# Patient Record
Sex: FEMALE | Race: WHITE | URBAN - METROPOLITAN AREA
[De-identification: names, ages, dates, MRNs, and addresses within clinical notes are randomized per-mention and may not be internally consistent; named-entity substitution may affect disease eponyms.]

---

## 2017-01-11 ENCOUNTER — OFFICE VISIT (OUTPATIENT)
Dept: DERMATOLOGY | Age: 52
End: 2017-01-11

## 2017-01-11 DIAGNOSIS — L57.8 DIFFUSE PHOTODAMAGE OF SKIN: ICD-10-CM

## 2017-01-11 DIAGNOSIS — L72.0 EPIDERMAL CYST: ICD-10-CM

## 2017-01-11 DIAGNOSIS — D22.9 BENIGN NEVUS: Primary | ICD-10-CM

## 2017-01-11 PROCEDURE — 99213 OFFICE O/P EST LOW 20 MIN: CPT | Performed by: DERMATOLOGY

## 2018-01-22 ENCOUNTER — OFFICE VISIT (OUTPATIENT)
Dept: DERMATOLOGY | Age: 53
End: 2018-01-22

## 2018-01-22 DIAGNOSIS — L57.8 DIFFUSE PHOTODAMAGE OF SKIN: ICD-10-CM

## 2018-01-22 DIAGNOSIS — L70.0 ACNE VULGARIS: ICD-10-CM

## 2018-01-22 DIAGNOSIS — D22.9 BENIGN NEVUS: Primary | ICD-10-CM

## 2018-01-22 DIAGNOSIS — L72.0 EPIDERMAL CYST: ICD-10-CM

## 2018-01-22 PROCEDURE — 99214 OFFICE O/P EST MOD 30 MIN: CPT | Performed by: DERMATOLOGY

## 2018-01-22 NOTE — PROGRESS NOTES
[address] [place] [address] Dermatology  Lilliam Berumen M.D.  [phone]       Gabe Starks  1965    46 y.o. female     Date of Visit: 1/22/2018    Chief Complaint:   Chief Complaint   Patient presents with    1 Year Follow Up    Skin Exam        I was asked to see this patient by Dr. Mcdonnell ref. provider found. History of Present Illness:  1. Total body skin exam    Stable lentigines face including right medial canthus. Not itching, bleeding, growing. Stable in size, shape, color    Occasional acne outbreak-uses daughters clindamycin solution to spot treat. Would like her own prescription. Multiple nevi. Stable in size, shape, color. Asymptomatic. Epidermal cyst left upper back-never had removed. Not bothersome. Has never been infected. Skin history:  No personal history of dysplastic nevi or skin cancer  Mother with a history of malignant melanoma in situ. Maternal uncle with ocular malignant melanoma    Friend of Annette Shore    Review of Systems:  Constitutional: Reports general sense of well-being       Past Medical History, Surgical History, Family History, Medications and Allergies reviewed. Social History:   Social History     Social History    Marital status:      Spouse name: N/A    Number of children: N/A    Years of education: N/A     Occupational History    Not on file. Social History Main Topics    Smoking status: Never Smoker    Smokeless tobacco: Never Used    Alcohol use Yes    Drug use: Unknown    Sexual activity: Not on file     Other Topics Concern    Not on file     Social History Narrative    No narrative on file       Physical Examination       -General: Well-appearing, NAD  Normal affect. Total body skin exam including scalp, face, neck, chest, abdomen, back, bilateral upper extremities, bilateral lower extremities, ocular conjunctiva, external lips, and nails was performed. Underwear area not examined.   Scattered on the trunk and extremities are multiple well-defined round and oval symmetric smoothly-bordered uniformly brown macules and papules. Mobile nodule with dilated punctum left upper back. Diffuse photo damage with freckling and lentigines-stable symmetric evenly pigmented light tan lentigo right medial canthus. No active acne today      Assessment and Plan     1. Benign nevus - Benign acquired melanocytic nevi  -Recommend monthly self skin exams   -Educated regarding the ABCDEs of melanoma detection   -Call for any new/changing moles or concerning lesions  -Reviewed sun protective behavior -- sun avoidance during the peak hours of the day, sun-protective clothing (including hat and sunglasses), sunscreen use (water resistant, broad spectrum, SPF at least 30, need for reapplication every 2 to 3 hours), avoidance of tanning beds      2. Epidermal cyst -Monitor for change-excision p.r.n. with plastics    3. Diffuse photodamage of skin -Hats, sunscreen, sun avoidance. Monitor lentigines for change    4. Acne vulgaris -Patient has a bottle of BenzaClin. Reviewed proper use. Discussed bleaching. Use this instead of clindamycin solution.

## 2018-02-15 ENCOUNTER — OFFICE VISIT (OUTPATIENT)
Dept: DERMATOLOGY | Age: 53
End: 2018-02-15

## 2018-02-15 DIAGNOSIS — H01.133 EYELID DERMATITIS, ECZEMATOUS, RIGHT: Primary | ICD-10-CM

## 2018-02-15 PROCEDURE — 99213 OFFICE O/P EST LOW 20 MIN: CPT | Performed by: DERMATOLOGY

## 2018-02-15 RX ORDER — TACROLIMUS 1 MG/G
OINTMENT TOPICAL
Qty: 30 G | Refills: 4 | Status: SHIPPED | OUTPATIENT
Start: 2018-02-15 | End: 2019-01-23

## 2018-02-15 NOTE — PROGRESS NOTES
[facility]) Dermatology  Desiree Titus M.D.  [phone]       Meir Cadet  1965    46 y.o. female     Date of Visit: 2/15/2018    Chief Complaint:   Chief Complaint   Patient presents with    Other     right eye lid        I was asked to see this patient by Dr. Mcdonnell ref. provider found. History of Present Illness:  1. Patient presents today for evaluation of pruritus, erythema, scale right upper eyelid. Present on and off for at least the last month but has progressed. She cannot remember any new contacts-no new eye creams, nail polish, makeup. She does have a history of seasonal allergies. No history of eczema or asthma        Skin history:  No personal history of dysplastic nevi or skin cancer  Mother with a history of malignant melanoma in situ.    Maternal uncle with ocular malignant melanoma     Friend of Marilu Carlisle    Review of Systems:  Constitutional: Reports general sense of well-being       Past Medical History, Surgical History, Family History, Medications and Allergies reviewed. Social History:   Social History     Social History    Marital status:      Spouse name: N/A    Number of children: N/A    Years of education: N/A     Occupational History    Not on file. Social History Main Topics    Smoking status: Never Smoker    Smokeless tobacco: Never Used    Alcohol use Yes    Drug use: Unknown    Sexual activity: Not on file     Other Topics Concern    Not on file     Social History Narrative    No narrative on file       Physical Examination       -General: Well-appearing, NAD  Erythematous scaly plaque right medial upper eyelid-eyelid dermatitis      Assessment and Plan     1. Eyelid dermatitis, eczematous, right -Protopic 0.1% ointment b.i.d. until resolution. Reviewed proper use and side effects. Gentle soaps, Aquaphor.

## 2018-06-27 ENCOUNTER — TELEPHONE (OUTPATIENT)
Dept: DERMATOLOGY | Age: 53
End: 2018-06-27

## 2018-06-28 ENCOUNTER — TELEPHONE (OUTPATIENT)
Dept: DERMATOLOGY | Age: 53
End: 2018-06-28

## 2019-01-23 ENCOUNTER — TELEPHONE (OUTPATIENT)
Dept: DERMATOLOGY | Age: 54
End: 2019-01-23

## 2019-01-23 ENCOUNTER — OFFICE VISIT (OUTPATIENT)
Dept: DERMATOLOGY | Age: 54
End: 2019-01-23
Payer: COMMERCIAL

## 2019-01-23 DIAGNOSIS — L57.8 DIFFUSE PHOTODAMAGE OF SKIN: ICD-10-CM

## 2019-01-23 DIAGNOSIS — D22.9 BENIGN NEVUS: Primary | ICD-10-CM

## 2019-01-23 DIAGNOSIS — R22.9 SKIN NODULE: Primary | ICD-10-CM

## 2019-01-23 DIAGNOSIS — R22.9 SKIN NODULE: ICD-10-CM

## 2019-01-23 PROCEDURE — 99213 OFFICE O/P EST LOW 20 MIN: CPT | Performed by: DERMATOLOGY

## 2019-01-23 RX ORDER — ESTRADIOL 0.5 MG/1
TABLET ORAL
Refills: 11 | COMMUNITY
Start: 2019-01-10

## 2020-01-14 ENCOUNTER — OFFICE VISIT (OUTPATIENT)
Dept: DERMATOLOGY | Age: 55
End: 2020-01-14
Payer: COMMERCIAL

## 2020-01-14 PROCEDURE — 99213 OFFICE O/P EST LOW 20 MIN: CPT | Performed by: DERMATOLOGY

## 2020-01-14 NOTE — PROGRESS NOTES
[facility]) Dermatology  Biju Bonilla M.D.  [phone]       Nuzhat Carreon  1965    47 y.o. female     Date of Visit: 1/14/2020    Chief Complaint:   Chief Complaint   Patient presents with    Lesion(s)     tbse. I was asked to see this patient by Dr. Mcdonnell ref. provider found. History of Present Illness:  1. Total-body skin exam    Multiple nevi-stable in size, shape, color. Has not noticed any new or changing pigmented lesions. Does monitor her skin for change. Never had blue nodule excised from right great dorsal toe-had been present 5-6 years. Initially thought it may have increased in size but now thinks it has been stable-asymptomatic. Did have a consult with plastics regarding removal    Pink macule nasal bridge-present for months. Initially was dry but now flat and stable. Not itching, bleeding. Not increasing in size          Skin history:  No personal history of dysplastic nevi or skin cancer  Mother with a history of malignant melanoma in situ.    Maternal uncle with ocular malignant melanoma     Friend of Artelia Sever    Review of Systems:  Constitutional: Reports general sense of well-being       Past Medical History, Surgical History, Family History, Medications and Allergies reviewed. Social History:   Social History     Socioeconomic History    Marital status:      Spouse name: Not on file    Number of children: Not on file    Years of education: Not on file    Highest education level: Not on file   Occupational History    Not on file   Social Needs    Financial resource strain: Not on file    Food insecurity:     Worry: Not on file     Inability: Not on file    Transportation needs:     Medical: Not on file     Non-medical: Not on file   Tobacco Use    Smoking status: Never Smoker    Smokeless tobacco: Never Used   Substance and Sexual Activity    Alcohol use:  Yes    Drug use: Not on file    Sexual activity: Not on file   Lifestyle    actinic keratosis, doubt basal cell carcinoma. Discussed lesion with patient who will monitor for change.   If it changes or grows will recheck otherwise recheck next year

## 2020-06-29 ENCOUNTER — TELEPHONE (OUTPATIENT)
Dept: DERMATOLOGY | Age: 55
End: 2020-06-29

## 2020-06-29 NOTE — TELEPHONE ENCOUNTER
Dr Sarita Lim Patient  Pt c/b #[phone]  Pt states  Has a cyst on back that is very painful it's raised more than it was before it has gotten bigger.  Pt wanted to see if she could possibly get in tmrw morning to have it looked at due to she will be driving out of town tmrw around 2 in the afternoon

## 2020-06-30 NOTE — TELEPHONE ENCOUNTER
spk to pt reg concern, she states that she just seen her PCP today and was prescribe antibiotic for 7 days. She will give us a call if the area is still the same within those 7 days and after her trip out of town.

## 2022-02-10 ENCOUNTER — OFFICE VISIT (OUTPATIENT)
Dept: DERMATOLOGY | Age: 57
End: 2022-02-10
Payer: COMMERCIAL

## 2022-02-10 DIAGNOSIS — D22.9 BENIGN NEVUS: Primary | ICD-10-CM

## 2022-02-10 DIAGNOSIS — L72.0 EPIDERMAL CYST: ICD-10-CM

## 2022-02-10 PROCEDURE — 99213 OFFICE O/P EST LOW 20 MIN: CPT | Performed by: DERMATOLOGY

## 2022-02-10 NOTE — PROGRESS NOTES
[facility]) Dermatology  Pee Mathew M.D.  [phone]       Ignacia Suarez  1965    64 y.o. female     Date of Visit: 2/10/2022    Chief Complaint:   Chief Complaint   Patient presents with    Skin Lesion        I was asked to see this patient by Dr. Mcdonnell ref. provider found. History of Present Illness:  1. Total-body skin exam    Multiple nevi-stable in size, shape, color. Has not noticed any new or changing pigmented lesions. Epidermal cyst left upper back-present for years. Bothersome to patient he scratches at it. Has not recently been infected or drained.         Skin history:  No personal history of dysplastic nevi or skin cancer  Mother with a history of malignant melanoma in situ.    Maternal uncle with ocular malignant melanoma     Friend of Dennie Moan    Review of Systems:  Constitutional: Reports general sense of well-being       Past Medical History, Surgical History, Family History, Medications and Allergies reviewed. Social History:   Social History     Socioeconomic History    Marital status:      Spouse name: Not on file    Number of children: Not on file    Years of education: Not on file    Highest education level: Not on file   Occupational History    Not on file   Tobacco Use    Smoking status: Never Smoker    Smokeless tobacco: Never Used   Vaping Use    Vaping Use: Never used   Substance and Sexual Activity    Alcohol use: Yes    Drug use: Not on file    Sexual activity: Not on file   Other Topics Concern    Not on file   Social History Narrative    Not on file     Social Determinants of Health     Financial Resource Strain:     Difficulty of Paying Living Expenses: Not on file   Food Insecurity:     Worried About Running Out of Food in the Last Year: Not on file    Delta of Food in the Last Year: Not on file   Transportation Needs:     Lack of Transportation (Medical): Not on file    Lack of Transportation (Non-Medical):  Not on file Physical Activity:     Days of Exercise per Week: Not on file    Minutes of Exercise per Session: Not on file   Stress:     Feeling of Stress : Not on file   Social Connections:     Frequency of Communication with Friends and Family: Not on file    Frequency of Social Gatherings with Friends and Family: Not on file    Attends Evangelical Services: Not on file    Active Member of [address] [place] [address] [employer] or Organizations: Not on file    Attends Club or Organization Meetings: Not on file    Marital Status: Not on file   Intimate Partner Violence:     Fear of Current or Ex-Partner: Not on file    Emotionally Abused: Not on file    Physically Abused: Not on file    Sexually Abused: Not on file   Housing Stability:     Unable to Pay for Housing in the Last Year: Not on file    Number of [place] in the Last Year: Not on file    Unstable Housing in the Last Year: Not on file       Physical Examination       -General: Well-appearing, NAD  1. Normal affect. Total body skin exam including scalp, face, neck, chest, abdomen, back, bilateral upper extremities, bilateral lower extremities, ocular conjunctiva, external lips, and nails was performed. Examination normal unless stated below. Underwear area not examined. Scattered on the trunk and extremities are multiple well-defined round and oval symmetric smoothly-bordered uniformly brown macules and papules. Mobile nodule with dilated punctum left upper back-postinflammatory hyperpigmentation surrounding cyst  Pink papule noted previously has resolved on nose    Assessment and Plan     1.  Benign nevus- Benign acquired melanocytic nevi  -Recommend monthly self skin exams   -Educated regarding the ABCDEs of melanoma detection   -Call for any new/changing moles or concerning lesions  -Reviewed sun protective behavior -- sun avoidance during the peak hours of the day, sun-protective clothing (including hat and sunglasses), sunscreen use (water resistant, broad spectrum, SPF at least 30, need for reapplication every 2 to 3 hours), avoidance of tanning beds       2.  Epidermal cyst-discussed excision with patient-she will consider-recommend excision through plastics

## 2023-02-14 ENCOUNTER — OFFICE VISIT (OUTPATIENT)
Dept: DERMATOLOGY | Age: 58
End: 2023-02-14
Payer: COMMERCIAL

## 2023-02-14 DIAGNOSIS — L57.8 DIFFUSE PHOTODAMAGE OF SKIN: ICD-10-CM

## 2023-02-14 DIAGNOSIS — L82.1 SEBORRHEIC KERATOSES: ICD-10-CM

## 2023-02-14 DIAGNOSIS — D22.9 BENIGN NEVUS: Primary | ICD-10-CM

## 2023-02-14 PROCEDURE — 99213 OFFICE O/P EST LOW 20 MIN: CPT | Performed by: DERMATOLOGY

## 2023-02-14 RX ORDER — ACETAMINOPHEN 160 MG
TABLET,DISINTEGRATING ORAL
COMMUNITY

## 2023-02-14 NOTE — PROGRESS NOTES
[facility]) Dermatology  Agnes Foster M.D.  [phone]       Radha Lucero  1965    62 y.o. female     Date of Visit: 2/14/2023    Chief Complaint:   Chief Complaint   Patient presents with    Skin Lesion        I was asked to see this patient by Dr. Mcdonnell ref. provider found. History of Present Illness:  1. TBSE    Multiple nevi. Patient has not noticed any new or changing pigmented lesions. Stable in size, shape, color. Not itching, bleeding. Photodamage. Progressive freckling and lentigines of sun exposed areas. Patient is wearing hats and sunscreen consistently. Skin history:  No personal history of dysplastic nevi or skin cancer  Mother with a history of malignant melanoma in situ. Maternal uncle with ocular malignant melanoma     Friend of David Knott    Review of Systems:  Constitutional: Reports general sense of well-being       Past Medical History, Surgical History, Family History, Medications and Allergies reviewed. Social History:   Social History     Socioeconomic History    Marital status:      Spouse name: Not on file    Number of children: Not on file    Years of education: Not on file    Highest education level: Not on file   Occupational History    Not on file   Tobacco Use    Smoking status: Never    Smokeless tobacco: Never   Vaping Use    Vaping Use: Never used   Substance and Sexual Activity    Alcohol use: Yes    Drug use: Not on file    Sexual activity: Not on file   Other Topics Concern    Not on file   Social History Narrative    Not on file     Social Determinants of Health     Financial Resource Strain: Not on file   Food Insecurity: Not on file   Transportation Needs: Not on file   Physical Activity: Not on file   Stress: Not on file   Social Connections: Not on file   Intimate Partner Violence: Not on file   Housing Stability: Not on file       Physical Examination       -General: Well-appearing, NAD  1. Normal affect.   Total body skin exam including scalp, face-scattered areas of superficial desquamation from recent peel, neck, chest, abdomen, back, bilateral upper extremities, bilateral lower extremities, ocular conjunctiva, external lips, and nails was performed. Examination normal unless stated below. Underwear area not examined. Scattered on the trunk and extremities are multiple well-defined round and oval symmetric smoothly-bordered uniformly brown macules and papules. Diffuse background photodamage with freckling and lentigines. Multiple hyperkeratotic stuck on papules and plaques torso, dorsal hands  Symmetric evenly pigmented light tan lentigo right ulnar dorsal hand    Assessment and Plan     1. Benign nevus - Benign acquired melanocytic nevi  -Recommend monthly self skin exams   -Educated regarding the ABCDEs of melanoma detection   -Call for any new/changing moles or concerning lesions  -Reviewed sun protective behavior -- sun avoidance during the peak hours of the day, sun-protective clothing (including hat and sunglasses), sunscreen use (water resistant, broad spectrum, SPF at least 30, need for reapplication every 2 to 3 hours), avoidance of tanning beds      2. Seborrheic keratoses - Discussed underlying nature of seborrheic keratosis and low risk of malignancy. Treatment reserved for lesions that are itching, bleeding, growing or otherwise becoming bothersome. Discussed monitoring for change with reevaluation for changing lesions. 3. Diffuse photodamage of skin-lentigo right ulnar dorsal hand-1 after the risks, complications, and alternatives were explained to the patient, including risk of blister formation, discomfort, scar, hypopigmentation, patient elected to proceed with cryotherapy. Lesion(s) were treated w/ liquid nitrogen using a Cryogun. 1 freeze thaw cycle was performed with a freeze time of 1-5 seconds. There were no immediate complications. Wound care instructions were discussed with the patient.     No charge    Discussed changes in skin from chronic UV exposure and ways to mitigate further damage- hats when outside, SPF 50 or higher that is reapplied regularly, daily SPF application, UV protective clothing, and sun avoidance.

## 2024-08-27 ENCOUNTER — OFFICE VISIT (OUTPATIENT)
Dept: DERMATOLOGY | Age: 59
End: 2024-08-27
Payer: COMMERCIAL

## 2024-08-27 DIAGNOSIS — D22.9 BENIGN NEVUS: Primary | ICD-10-CM

## 2024-08-27 DIAGNOSIS — L82.1 SEBORRHEIC KERATOSES: ICD-10-CM

## 2024-08-27 PROCEDURE — 99213 OFFICE O/P EST LOW 20 MIN: CPT | Performed by: DERMATOLOGY

## 2024-08-27 NOTE — PROGRESS NOTES
[facility] Dermatology  Rocio Montoya M.D.  [phone]       Elidia Loera  1965    59 y.o. female     Date of Visit: 8/27/2024    Chief Complaint:   Chief Complaint   Patient presents with    Skin Lesion        I was asked to see this patient by Dr. Mcdonnell ref. provider found.    History of Present Illness:  1. TBSE    Multiple nevi. Patient has not noticed any new or changing pigmented lesions.   Stable in size, shape, color. Not itching, bleeding.     Seborrheic keratoses.  Increasing number of hyperkeratotic stuck on papules and plaques over the torso.  No discrete lesion itching, bleeding, becoming symptomatic.   Skin history:  No personal history of dysplastic nevi or skin cancer  Mother with a history of malignant melanoma in situ.    Maternal uncle with ocular malignant melanoma     Friend of Shannan Salgado    Review of Systems:  Constitutional: Reports general sense of well-being       Past Medical History, Surgical History, Family History, Medications and Allergies reviewed.    Social History:   Social History     Socioeconomic History    Marital status:      Spouse name: Not on file    Number of children: Not on file    Years of education: Not on file    Highest education level: Not on file   Occupational History    Not on file   Tobacco Use    Smoking status: Never    Smokeless tobacco: Never   Vaping Use    Vaping status: Never Used   Substance and Sexual Activity    Alcohol use: Yes    Drug use: Not on file    Sexual activity: Not on file   Other Topics Concern    Not on file   Social History Narrative    Not on file     Social Determinants of Health     Financial Resource Strain: Not on file   Food Insecurity: Unknown (1/18/2024)    [facility]     Food Insecurities     Worried about running out of food: Not on file     Food Bought: Not on file   Transportation Needs: Unknown (1/18/2024)    Received from [facility]     Transportation     Worried about transportation: Not